# Patient Record
Sex: FEMALE | Race: BLACK OR AFRICAN AMERICAN | NOT HISPANIC OR LATINO | URBAN - METROPOLITAN AREA
[De-identification: names, ages, dates, MRNs, and addresses within clinical notes are randomized per-mention and may not be internally consistent; named-entity substitution may affect disease eponyms.]

---

## 2024-03-27 ENCOUNTER — HOSPITAL ENCOUNTER (EMERGENCY)
Facility: HOSPITAL | Age: 24
Discharge: HOME | End: 2024-03-27
Attending: STUDENT IN AN ORGANIZED HEALTH CARE EDUCATION/TRAINING PROGRAM
Payer: COMMERCIAL

## 2024-03-27 VITALS
DIASTOLIC BLOOD PRESSURE: 81 MMHG | HEART RATE: 72 BPM | HEIGHT: 67 IN | RESPIRATION RATE: 16 BRPM | SYSTOLIC BLOOD PRESSURE: 134 MMHG | BODY MASS INDEX: 27.47 KG/M2 | TEMPERATURE: 98 F | WEIGHT: 175 LBS | OXYGEN SATURATION: 99 %

## 2024-03-27 DIAGNOSIS — R51.9 ACUTE NONINTRACTABLE HEADACHE, UNSPECIFIED HEADACHE TYPE: Primary | ICD-10-CM

## 2024-03-27 LAB
B-HCG UR QL: NEGATIVE
CTP QC/QA: YES

## 2024-03-27 PROCEDURE — 81025 URINE PREGNANCY TEST: CPT | Performed by: STUDENT IN AN ORGANIZED HEALTH CARE EDUCATION/TRAINING PROGRAM

## 2024-03-27 PROCEDURE — 96372 THER/PROPH/DIAG INJ SC/IM: CPT | Performed by: STUDENT IN AN ORGANIZED HEALTH CARE EDUCATION/TRAINING PROGRAM

## 2024-03-27 PROCEDURE — 99284 EMERGENCY DEPT VISIT MOD MDM: CPT | Mod: 25

## 2024-03-27 PROCEDURE — 63600175 PHARM REV CODE 636 W HCPCS: Performed by: STUDENT IN AN ORGANIZED HEALTH CARE EDUCATION/TRAINING PROGRAM

## 2024-03-27 RX ORDER — KETOROLAC TROMETHAMINE 30 MG/ML
15 INJECTION, SOLUTION INTRAMUSCULAR; INTRAVENOUS
Status: COMPLETED | OUTPATIENT
Start: 2024-03-27 | End: 2024-03-27

## 2024-03-27 RX ORDER — ACETAMINOPHEN 500 MG
500 TABLET ORAL EVERY 6 HOURS PRN
Qty: 30 TABLET | Refills: 0 | Status: SHIPPED | OUTPATIENT
Start: 2024-03-27

## 2024-03-27 RX ORDER — IBUPROFEN 400 MG/1
400 TABLET ORAL EVERY 6 HOURS PRN
Qty: 20 TABLET | Refills: 0 | Status: SHIPPED | OUTPATIENT
Start: 2024-03-27 | End: 2024-06-07

## 2024-03-27 RX ADMIN — KETOROLAC TROMETHAMINE 15 MG: 30 INJECTION, SOLUTION INTRAMUSCULAR; INTRAVENOUS at 09:03

## 2024-03-27 NOTE — Clinical Note
"Bc Mendez" Mark was seen and treated in our emergency department on 3/27/2024.  She may return to work on 03/28/2024.       If you have any questions or concerns, please don't hesitate to call.       RN    "

## 2024-03-27 NOTE — ED TRIAGE NOTES
"Pt c/o "pressure that has been going on since Monday 3/25/24, it got better then became worse lastnight" pt reports taking lexapro and abruptly stopped taking medication estimated x2 weeks ago. c/o diarrhea, light is very bright, blurred vision intermittently  "

## 2024-03-27 NOTE — Clinical Note
"Bc"Edson Flores was seen and treated in our emergency department on 3/27/2024.  She may return to work on 03/28/2024.       If you have any questions or concerns, please don't hesitate to call.      Alena Whitten MD"

## 2024-03-27 NOTE — ED PROVIDER NOTES
"Encounter Date: 3/27/2024       History     Chief Complaint   Patient presents with    Blurred Vision     Patient reports "pressure" in her head with intermittent blurred vision since Monday afternoon. Endorses nausea on Monday that has since resolved. Hx of migraines. Endorses stopping Lexapro dose x two weeks ago cold turkey.      HPI  24 yo woman with history of preeclampsia, postpartum depression, presenting intermittent bilateral frontal head pressure, associated with blurry vision, mild nausea, photophobia.    No fever, no neck pain, no cough/congestion, headache is not maximal in onset, no numbness or weakness in extremities.    Patient reports that she has been taking Lexapro 10 mg for postpartum depression after  in 2023, she stopped taking her Lexapro with no taper 2 weeks ago, began having headaches, mild nausea, fatigue since then.  Has not had migraines in the past.  Denies any SI/HI/AVH or other depressive symptoms.      Review of patient's allergies indicates:  No Known Allergies  No past medical history on file.  No past surgical history on file.  No family history on file.     Review of Systems    Physical Exam     Initial Vitals [24 0847]   BP Pulse Resp Temp SpO2   134/81 72 16 97.5 °F (36.4 °C) 99 %      MAP       --         Physical Exam    Nursing note and vitals reviewed.  Constitutional: No distress.   HENT:   Head: Atraumatic.   Mouth/Throat: Oropharynx is clear and moist and mucous membranes are normal.   Eyes: Conjunctivae and EOM are normal. Pupils are equal, round, and reactive to light. Right eye exhibits no discharge. Left eye exhibits no discharge. Right conjunctiva is not injected. Left conjunctiva is not injected. No scleral icterus.   Cardiovascular:  Normal heart sounds.           Pulmonary/Chest: Effort normal and breath sounds normal. No respiratory distress.   Abdominal: Abdomen is soft. She exhibits no distension. There is no abdominal tenderness. "     Neurological: No cranial nerve deficit. Gait normal.   No nystagmus, shoulder shrug intact, face symmetric, tongue midline, finger-nose-finger nl, sensation intact and equal in face/arms/legs, upper extremity and lower extremity flexor and extensor strength equal and intact, ambulation at baseline     Skin: Skin is warm. No ecchymosis and no rash noted.         ED Course   Procedures  Labs Reviewed   POCT URINE PREGNANCY          Imaging Results    None          Medications   ketorolac injection 15 mg (has no administration in time range)     Medical Decision Making  22 yo woman with history of preeclampsia, postpartum depression, presenting intermittent bilateral frontal head pressure, associated with blurry vision, mild nausea, photophobia.    Differential diagnosis includes but isn't limited to: Tension headache, migraine, SSRI discontinuation syndrome    Less concern for CSVT given patient is 9 months postpartum, blurry vision is bilateral, and associated with photophobia and headaches, with no conjunctival injection, no history of DVT.  Given patient has never had headaches before, with no other headache red flags or focal neural deficits on exam and history of stopping Lexapro with no taper, these symptoms including fatigue, mild nausea, may be due to SSRI discontinuation syndrome, will treat the patient with Toradol IM for the head pressure, and recommend restarting Lexapro at her original dosage, and following with her primary care doctor who prescribed this medication regarding taper instructions.    I discussed with the patient/family the diagnosis, treatment plan, indications for return to the emergency department, as well as for expected follow-up. The patient/family verbalized an understanding. The patient/family is asked if there were any questions or concerns, which were addressed to patient/family satisfaction. Patient/family understands and is agreeable to the plan.     DISCLAIMER: This note was  prepared with [employer] voice recognition transcription software. Garbled syntax, mangled pronouns, and other bizarre constructions may be attributed to that software system.                                        Clinical Impression:  Final diagnoses:  [R51.9] Acute nonintractable headache, unspecified headache type (Primary)          ED Disposition Condition    Discharge Stable          ED Prescriptions    None       Follow-up Information    None          Alena Whitten MD  03/27/24 0996

## 2024-03-27 NOTE — Clinical Note
"Bc Mendez" Mark was seen and treated in our emergency department on 3/27/2024.  She may return to work on 03/28/2024.       If you have any questions or concerns, please don't hesitate to call.       MD    "

## 2024-03-27 NOTE — DISCHARGE INSTRUCTIONS
